# Patient Record
Sex: MALE | Race: BLACK OR AFRICAN AMERICAN | NOT HISPANIC OR LATINO | Employment: FULL TIME | URBAN - METROPOLITAN AREA
[De-identification: names, ages, dates, MRNs, and addresses within clinical notes are randomized per-mention and may not be internally consistent; named-entity substitution may affect disease eponyms.]

---

## 2017-08-29 ENCOUNTER — TELEPHONE (OUTPATIENT)
Dept: ORTHOPEDICS | Facility: CLINIC | Age: 57
End: 2017-08-29

## 2017-08-29 NOTE — TELEPHONE ENCOUNTER
----- Message from Long Machado sent at 8/29/2017  1:38 PM CDT -----  Contact: [phone]  Pt would like to consult with nurse about MRI before appt. Has questions. Please call back [phone]. [email or web address]. [email or web address]

## 2017-08-29 NOTE — TELEPHONE ENCOUNTER
Discussed the request for an MRI prior to being seen by Orthopedics.     I advised him that we can not order an MRI at this time due to that he is not yet an established Orthopedic patient and also due to that insurances often require documentation of treatment with anti-inflammatories and physical therapy as well as an xray prior to ordering an MRI. I informed him that his PCP can certainly order an MRI if they feel it's necessary and he could/should bring the MRI disc and report with him if he gets the MRI done prior to the appt.     He verbalized understanding and had no further questions.

## 2017-09-19 DIAGNOSIS — M25.512 LEFT SHOULDER PAIN, UNSPECIFIED CHRONICITY: Primary | ICD-10-CM

## 2017-09-21 ENCOUNTER — OFFICE VISIT (OUTPATIENT)
Dept: ORTHOPEDICS | Facility: CLINIC | Age: 57
End: 2017-09-21
Payer: MEDICAID

## 2017-09-21 ENCOUNTER — HOSPITAL ENCOUNTER (OUTPATIENT)
Dept: RADIOLOGY | Facility: HOSPITAL | Age: 57
Discharge: HOME | End: 2017-09-21
Attending: PHYSICIAN ASSISTANT
Payer: MEDICAID

## 2017-09-21 VITALS
HEART RATE: 65 BPM | HEIGHT: 72 IN | SYSTOLIC BLOOD PRESSURE: 134 MMHG | DIASTOLIC BLOOD PRESSURE: 84 MMHG | WEIGHT: 245.56 LBS | BODY MASS INDEX: 33.26 KG/M2

## 2017-09-21 DIAGNOSIS — M75.22 BICEPS TENDINITIS OF LEFT SHOULDER: ICD-10-CM

## 2017-09-21 DIAGNOSIS — S29.012A RHOMBOID MUSCLE STRAIN, INITIAL ENCOUNTER: ICD-10-CM

## 2017-09-21 DIAGNOSIS — M25.512 LEFT SHOULDER PAIN, UNSPECIFIED CHRONICITY: ICD-10-CM

## 2017-09-21 DIAGNOSIS — M25.512 ACUTE PAIN OF LEFT SHOULDER: Primary | ICD-10-CM

## 2017-09-21 PROCEDURE — 3008F BODY MASS INDEX DOCD: CPT | Mod: ,,, | Performed by: PHYSICIAN ASSISTANT

## 2017-09-21 PROCEDURE — 73030 X-RAY EXAM OF SHOULDER: CPT | Mod: 26,LT,, | Performed by: RADIOLOGY

## 2017-09-21 PROCEDURE — 3079F DIAST BP 80-89 MM HG: CPT | Mod: ,,, | Performed by: PHYSICIAN ASSISTANT

## 2017-09-21 PROCEDURE — 3075F SYST BP GE 130 - 139MM HG: CPT | Mod: ,,, | Performed by: PHYSICIAN ASSISTANT

## 2017-09-21 PROCEDURE — 99999 PR PBB SHADOW E&M-EST. PATIENT-LVL IV: CPT | Mod: PBBFAC,,, | Performed by: PHYSICIAN ASSISTANT

## 2017-09-21 PROCEDURE — 99203 OFFICE O/P NEW LOW 30 MIN: CPT | Mod: S$PBB,,, | Performed by: PHYSICIAN ASSISTANT

## 2017-09-21 PROCEDURE — 99214 OFFICE O/P EST MOD 30 MIN: CPT | Mod: PBBFAC,25,PO | Performed by: PHYSICIAN ASSISTANT

## 2017-09-21 PROCEDURE — 73030 X-RAY EXAM OF SHOULDER: CPT | Mod: TC,PO,LT

## 2017-09-21 RX ORDER — DICLOFENAC SODIUM 50 MG/1
50 TABLET, DELAYED RELEASE ORAL 2 TIMES DAILY
COMMUNITY
End: 2017-11-02 | Stop reason: SDUPTHER
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
## 2017-09-22 ENCOUNTER — TELEPHONE (OUTPATIENT)
Dept: ORTHOPEDICS | Facility: CLINIC | Age: 57
End: 2017-09-22

## 2017-09-22 NOTE — TELEPHONE ENCOUNTER
Patient called about picking up paperwork. Pt will  progress notes today. Time wasn't confirmed. Pt verbalized understanding. -AS

## 2017-09-27 ENCOUNTER — TELEPHONE (OUTPATIENT)
Dept: ORTHOPEDICS | Facility: CLINIC | Age: 57
End: 2017-09-27

## 2017-09-27 DIAGNOSIS — M25.512 ACUTE PAIN OF LEFT SHOULDER: Primary | ICD-10-CM

## 2017-09-27 DIAGNOSIS — S29.012A RHOMBOID MUSCLE STRAIN, INITIAL ENCOUNTER: ICD-10-CM

## 2017-09-27 NOTE — PROGRESS NOTES
Spoke to patient.  His MRI was denied.  I have submitted orders for him to go to the [facility] for physical therapy.  He should follow-up with me in 4 weeks to reevaluate the shoulder.

## 2017-09-27 NOTE — TELEPHONE ENCOUNTER
MRI not approved. Pt verbalized understanding of below info.    I put in PT orders for [facility].  Please fax to them and have patient f/u with me in about 4 weeks.  Thanks.   Sloane GOMEZ

## 2017-10-04 ENCOUNTER — TELEPHONE (OUTPATIENT)
Dept: ORTHOPEDICS | Facility: CLINIC | Age: 57
End: 2017-10-04

## 2017-10-04 NOTE — TELEPHONE ENCOUNTER
----- Message from Lisa Hammond sent at 10/4/2017  8:58 AM CDT -----  Would like to speak to nurse about physical therapy. Please call back at [phone]. thanks

## 2017-10-06 ENCOUNTER — TELEPHONE (OUTPATIENT)
Dept: ORTHOPEDICS | Facility: CLINIC | Age: 57
End: 2017-10-06

## 2017-10-06 NOTE — TELEPHONE ENCOUNTER
----- Message from David Plaza sent at 10/6/2017  2:01 PM CDT -----  Contact: Pt  Pt needs to speak with nurse regarding a letter requesting therapy. Please give pt a call at ..[phone] (home)

## 2017-11-02 ENCOUNTER — OFFICE VISIT (OUTPATIENT)
Dept: DERMATOLOGY | Facility: CLINIC | Age: 57
End: 2017-11-02
Payer: MEDICAID

## 2017-11-02 DIAGNOSIS — L23.3 ALLERGIC CONTACT DERMATITIS DUE TO DRUGS IN CONTACT WITH SKIN: ICD-10-CM

## 2017-11-02 DIAGNOSIS — L98.9 ECZEMATOUS SKIN LESIONS: Primary | ICD-10-CM

## 2017-11-02 PROCEDURE — 99202 OFFICE O/P NEW SF 15 MIN: CPT | Mod: S$PBB,,, | Performed by: DERMATOLOGY

## 2017-11-02 PROCEDURE — 99999 PR PBB SHADOW E&M-EST. PATIENT-LVL II: CPT | Mod: PBBFAC,,, | Performed by: DERMATOLOGY

## 2017-11-02 PROCEDURE — 99212 OFFICE O/P EST SF 10 MIN: CPT | Mod: PBBFAC,PO | Performed by: DERMATOLOGY

## 2017-11-02 RX ORDER — ATORVASTATIN CALCIUM 80 MG/1
80 TABLET, FILM COATED ORAL EVERY MORNING
Refills: 6 | COMMUNITY
Start: 2017-07-28

## 2017-11-02 RX ORDER — PROPRANOLOL HYDROCHLORIDE 20 MG/1
20 TABLET ORAL
COMMUNITY
Start: 2017-08-23

## 2017-11-02 RX ORDER — HYDROXYZINE HYDROCHLORIDE 10 MG/1
10 TABLET, FILM COATED ORAL NIGHTLY PRN
Qty: 30 TABLET | Refills: 3 | Status: SHIPPED | OUTPATIENT
Start: 2017-11-02 | End: 2018-02-27 | Stop reason: SDUPTHER

## 2017-11-02 RX ORDER — OXYCODONE HYDROCHLORIDE 15 MG/1
TABLET ORAL
COMMUNITY
Start: 2015-06-11

## 2017-11-02 RX ORDER — DICLOFENAC SODIUM 75 MG/1
TABLET, DELAYED RELEASE ORAL
Refills: 1 | COMMUNITY
Start: 2017-09-17

## 2017-11-02 RX ORDER — LISINOPRIL 20 MG/1
20 TABLET ORAL DAILY
Refills: 5 | COMMUNITY
Start: 2017-10-23

## 2017-11-02 RX ORDER — UBIDECARENONE 30 MG
30 CAPSULE ORAL
COMMUNITY
Start: 2017-08-23

## 2017-11-02 RX ORDER — TERBINAFINE HYDROCHLORIDE 250 MG/1
TABLET ORAL
Refills: 0 | COMMUNITY
Start: 2017-09-17

## 2017-11-02 RX ORDER — TIZANIDINE 4 MG/1
TABLET ORAL
COMMUNITY
Start: 2017-10-24

## 2017-11-02 RX ORDER — PIMECROLIMUS 10 MG/G
CREAM TOPICAL
Qty: 30 G | Refills: 1 | Status: SHIPPED | OUTPATIENT
Start: 2017-11-02 | End: 2017-11-09

## 2017-11-02 RX ORDER — TRIAMCINOLONE ACETONIDE 0.25 MG/G
CREAM TOPICAL
Qty: 30 G | Refills: 1 | Status: SHIPPED | OUTPATIENT
Start: 2017-11-02 | End: 2018-11-02

## 2017-11-02 RX ORDER — LEVOCETIRIZINE DIHYDROCHLORIDE 5 MG/1
TABLET, FILM COATED ORAL
Qty: 30 TABLET | Refills: 11 | Status: SHIPPED | OUTPATIENT
Start: 2017-11-02

## 2017-11-02 NOTE — PROGRESS NOTES
Subjective:       Patient ID:  David Jiménez is a 57 y.o. male who presents for   Chief Complaint   Patient presents with    Rash     c/o rash around mouth x 6+ months,,itchy,,tx with TAC, Nystatin Ointment and Xyzal     History of Present Illness: The patient presents with chief complaint of rash.  Appears after using hair dye.   Location: beard area  Duration: 6 months  Signs/Symptoms: pruritus, scaly    Prior treatments: nystatin oint, xyzal, TAC cream          Review of Systems   Constitutional: Negative for fever and chills.   Gastrointestinal: Negative for nausea and vomiting.   Skin: Positive for itching and rash. Negative for daily sunscreen use, activity-related sunscreen use and recent sunburn.   Hematologic/Lymphatic: Does not bruise/bleed easily.        Objective:    Physical Exam   Constitutional: He appears well-developed and well-nourished. No distress.   Neurological: He is alert and oriented to person, place, and time. He is not disoriented.   Psychiatric: He has a normal mood and affect.   Skin:   Areas Examined (abnormalities noted in diagram):   Scalp / Hair Palpated and Inspected  Head / Face Inspection Performed  Neck Inspection Performed  Chest / Axilla Inspection Performed  Abdomen Inspection Performed  Back Inspection Performed  RUE Inspected  LUE Inspection Performed  Nails and Digits Inspection Performed                  Assessment / Plan:        History of allergy to hair dye  Eczematous skin lesions  -     triamcinolone acetonide 0.025% (KENALOG) 0.025 % cream; AAA bid as needed for flares.  Mild steroid.  Dispense: 30 g; Refill: 1  -     pimecrolimus (ELIDEL) 1 % cream; AAA bid  Dispense: 30 g; Refill: 1  -     levocetirizine (XYZAL) 5 MG tablet; Take 1 tablet each morning.  Dispense: 30 tablet; Refill: 11  -     hydrOXYzine HCl (ATARAX) 10 MG Tab; Take 1 tablet (10 mg total) by mouth nightly as needed. May cause drowsiness.  Do not drive or operate heavy machinery.  Dispense: 30  tablet; Refill:   -     Recommend PPD free hair dye, such as [employer].  Will start TAC 0.025% cream c/ elidel bid.  Will add xyzal and hydroxyzine for itch.  Discussed importance of avoiding allergens with PPD.  The patient acknowledged understanding.                Return if symptoms worsen or fail to improve.

## 2017-11-06 ENCOUNTER — TELEPHONE (OUTPATIENT)
Dept: DERMATOLOGY | Facility: CLINIC | Age: 57
End: 2017-11-06

## 2017-11-06 NOTE — TELEPHONE ENCOUNTER
----- Message from Lisa Peace sent at 11/6/2017 11:09 AM CST -----  Patient would like to speak to nurse regarding his medication. Please adv/call [phone].//thanks. cw

## 2017-11-08 ENCOUNTER — TELEPHONE (OUTPATIENT)
Dept: DERMATOLOGY | Facility: CLINIC | Age: 57
End: 2017-11-08

## 2017-11-09 DIAGNOSIS — L23.4 ALLERGIC CONTACT DERMATITIS DUE TO DYES: Primary | ICD-10-CM

## 2017-11-09 RX ORDER — TACROLIMUS 1 MG/G
OINTMENT TOPICAL 2 TIMES DAILY PRN
Qty: 30 G | Refills: 3 | Status: SHIPPED | OUTPATIENT
Start: 2017-11-09 | End: 2018-11-09

## 2017-11-30 ENCOUNTER — TELEPHONE (OUTPATIENT)
Dept: ORTHOPEDICS | Facility: CLINIC | Age: 57
End: 2017-11-30

## 2017-11-30 NOTE — TELEPHONE ENCOUNTER
Spoke with the patient  In regards to having his MRI scheduled again. Pt was contacted and transferred to schedule with radiology. -AS

## 2017-12-07 ENCOUNTER — TELEPHONE (OUTPATIENT)
Dept: RADIOLOGY | Facility: HOSPITAL | Age: 57
End: 2017-12-07

## 2017-12-11 ENCOUNTER — TELEPHONE (OUTPATIENT)
Dept: RADIOLOGY | Facility: HOSPITAL | Age: 57
End: 2017-12-11

## 2017-12-12 ENCOUNTER — HOSPITAL ENCOUNTER (OUTPATIENT)
Dept: RADIOLOGY | Facility: HOSPITAL | Age: 57
Discharge: HOME | End: 2017-12-12
Attending: PHYSICIAN ASSISTANT
Payer: MEDICAID

## 2017-12-12 DIAGNOSIS — M25.512 ACUTE PAIN OF LEFT SHOULDER: ICD-10-CM

## 2017-12-12 DIAGNOSIS — S29.012A RHOMBOID MUSCLE STRAIN, INITIAL ENCOUNTER: ICD-10-CM

## 2017-12-12 DIAGNOSIS — M75.22 BICEPS TENDINITIS OF LEFT SHOULDER: ICD-10-CM

## 2017-12-12 PROCEDURE — 73221 MRI JOINT UPR EXTREM W/O DYE: CPT | Mod: 26,LT,, | Performed by: RADIOLOGY

## 2017-12-12 PROCEDURE — 73221 MRI JOINT UPR EXTREM W/O DYE: CPT | Mod: TC,PO,LT

## 2017-12-14 ENCOUNTER — TELEPHONE (OUTPATIENT)
Dept: ORTHOPEDICS | Facility: CLINIC | Age: 57
End: 2017-12-14

## 2017-12-14 NOTE — TELEPHONE ENCOUNTER
----- Message from Sloane Lundberg PA-C sent at 12/14/2017  1:53 PM CST -----  I tried calling the patient and had to leave a voicemail to return my call.  He has a torn rotator cuff.  I recommend he see one of our surgeons to discuss surgical repair.

## 2017-12-15 ENCOUNTER — TELEPHONE (OUTPATIENT)
Dept: ORTHOPEDICS | Facility: CLINIC | Age: 57
End: 2017-12-15

## 2017-12-15 NOTE — TELEPHONE ENCOUNTER
Spoke to patient re: MRI.  He has a torn RCT.  Will ask staff to get him scheduled with a surgeon to discuss repair.      ----- Message from Bonifacio Campuzano MA sent at 12/14/2017  5:08 PM CST -----  Contact: pt  Patient calling for MRI results    Thanks  [email or web address]       ----- Message -----  From: Tia Sanchez  Sent: 12/14/2017   4:20 PM  To: [facility] Staff    States he's calling regarding his MRI results. Please call pt at [phone]. Thank you

## 2017-12-22 ENCOUNTER — TELEPHONE (OUTPATIENT)
Dept: ORTHOPEDICS | Facility: CLINIC | Age: 57
End: 2017-12-22

## 2017-12-22 NOTE — TELEPHONE ENCOUNTER
----- Message from Carly Patel sent at 12/22/2017  1:47 PM CST -----  Contact: son  Would like to consult with nurse regarding surgical appointment . Please call back at [phone].      Thanks,  Carly Patel

## 2018-01-03 ENCOUNTER — OFFICE VISIT (OUTPATIENT)
Dept: ORTHOPEDICS | Facility: CLINIC | Age: 58
End: 2018-01-03
Payer: MEDICAID

## 2018-01-03 VITALS
RESPIRATION RATE: 18 BRPM | HEART RATE: 79 BPM | HEIGHT: 72 IN | BODY MASS INDEX: 33.26 KG/M2 | DIASTOLIC BLOOD PRESSURE: 82 MMHG | SYSTOLIC BLOOD PRESSURE: 134 MMHG | WEIGHT: 245.56 LBS

## 2018-01-03 DIAGNOSIS — M75.22 TENDONITIS OF UPPER BICEPS TENDON OF LEFT SHOULDER: ICD-10-CM

## 2018-01-03 DIAGNOSIS — M25.512 ACUTE PAIN OF LEFT SHOULDER: ICD-10-CM

## 2018-01-03 DIAGNOSIS — M75.122 COMPLETE ROTATOR CUFF TEAR OF LEFT SHOULDER: Primary | ICD-10-CM

## 2018-01-03 PROCEDURE — 99999 PR PBB SHADOW E&M-EST. PATIENT-LVL III: CPT | Mod: PBBFAC,,, | Performed by: ORTHOPAEDIC SURGERY

## 2018-01-03 PROCEDURE — 99214 OFFICE O/P EST MOD 30 MIN: CPT | Mod: S$PBB,,, | Performed by: ORTHOPAEDIC SURGERY

## 2018-01-03 PROCEDURE — 99213 OFFICE O/P EST LOW 20 MIN: CPT | Mod: PBBFAC,PO | Performed by: ORTHOPAEDIC SURGERY

## 2018-01-03 RX ORDER — PROPRANOLOL HYDROCHLORIDE 20 MG/1
TABLET ORAL
COMMUNITY
Start: 2017-11-21

## 2018-01-03 RX ORDER — PROMETHAZINE HYDROCHLORIDE 25 MG/1
TABLET ORAL
Refills: 0 | COMMUNITY
Start: 2017-11-29
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
## 2018-01-03 NOTE — LETTER
January 3, 2018      Sloane Lundberg PA-C  [address] [place] [address]           [facility]  [address] [place] [address]  Phone: [phone]  Fax: [phone]          Patient: David Jiménez   MR Number: [id number]   YOB: 1960   Date of Visit: 1/3/2018       Dear Sloane Lundberg:    Thank you for referring David Jiménez to me for evaluation. Attached you will find relevant portions of my assessment and plan of care.    If you have questions, please do not hesitate to call me. I look forward to following David Jiménez along with you.    Sincerely,    Tommy Meadows MD    Enclosure  CC:  No Recipients    If you would like to receive this communication electronically, please contact [email or web address] or [phone] to request more information on [employer] access.    For providers and/or their staff who would like to refer a patient to [facility], please contact us through our one-stop-shop provider referral line, [facility], at [phone].    If you feel you have received this communication in error or would no longer like to receive these types of communications, please e-mail [email or web address]

## 2018-01-31 ENCOUNTER — TELEPHONE (OUTPATIENT)
Dept: ORTHOPEDICS | Facility: CLINIC | Age: 58
End: 2018-01-31

## 2018-01-31 NOTE — TELEPHONE ENCOUNTER
----- Message from Lisa Peace sent at 1/31/2018 10:58 AM CST -----  Patient requesting a copy of his x-ray. Please adv/call [phone].//thanks. cw

## 2018-01-31 NOTE — TELEPHONE ENCOUNTER
Re: Radiology Images. Unable to leave a message.     He can get a disc with Xray Images and MRI from 2nd [facility] dept or from [facility]. Reports will have to be obtained from Orthopedic staff or Medical Records.

## 2018-02-27 DIAGNOSIS — L98.9 ECZEMATOUS SKIN LESIONS: ICD-10-CM

## 2018-03-01 ENCOUNTER — TELEPHONE (OUTPATIENT)
Dept: DERMATOLOGY | Facility: CLINIC | Age: 58
End: 2018-03-01

## 2018-03-01 RX ORDER — HYDROXYZINE HYDROCHLORIDE 10 MG/1
TABLET, FILM COATED ORAL
Qty: 30 TABLET | Refills: 3 | Status: SHIPPED | OUTPATIENT
Start: 2018-03-01

## 2018-03-01 NOTE — TELEPHONE ENCOUNTER
Pharmacy was calling to see did pt need refills. I advised that we hadnt received any refill request from pt.

## 2018-03-01 NOTE — TELEPHONE ENCOUNTER
----- Message from John Travis sent at 3/1/2018  9:19 AM CST -----  Contact: [facility]  Call [facility] regarding refill for the pt    [id number] - [place] - [address]  [address] [address]  Phone: [phone] Fax: [phone]